# Patient Record
Sex: MALE | Race: OTHER | HISPANIC OR LATINO
[De-identification: names, ages, dates, MRNs, and addresses within clinical notes are randomized per-mention and may not be internally consistent; named-entity substitution may affect disease eponyms.]

---
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
## 2024-09-13 ENCOUNTER — NON-APPOINTMENT (OUTPATIENT)
Age: 24
End: 2024-09-13

## 2024-10-18 ENCOUNTER — APPOINTMENT (OUTPATIENT)
Dept: OTOLARYNGOLOGY | Facility: CLINIC | Age: 24
End: 2024-10-18
Payer: COMMERCIAL

## 2024-10-18 ENCOUNTER — NON-APPOINTMENT (OUTPATIENT)
Age: 24
End: 2024-10-18

## 2024-10-18 VITALS
WEIGHT: 168 LBS | DIASTOLIC BLOOD PRESSURE: 85 MMHG | BODY MASS INDEX: 23.52 KG/M2 | SYSTOLIC BLOOD PRESSURE: 127 MMHG | HEIGHT: 71 IN

## 2024-10-18 DIAGNOSIS — Z78.9 OTHER SPECIFIED HEALTH STATUS: ICD-10-CM

## 2024-10-18 DIAGNOSIS — L91.0 HYPERTROPHIC SCAR: ICD-10-CM

## 2024-10-18 DIAGNOSIS — F17.200 NICOTINE DEPENDENCE, UNSPECIFIED, UNCOMPLICATED: ICD-10-CM

## 2024-10-18 PROCEDURE — 99203 OFFICE O/P NEW LOW 30 MIN: CPT

## 2024-11-12 ENCOUNTER — APPOINTMENT (OUTPATIENT)
Dept: OTOLARYNGOLOGY | Facility: CLINIC | Age: 24
End: 2024-11-12
Payer: COMMERCIAL

## 2024-11-12 PROCEDURE — 11311 SHAVE SKIN LESION 0.6-1.0 CM: CPT

## 2024-11-20 ENCOUNTER — APPOINTMENT (OUTPATIENT)
Dept: OTOLARYNGOLOGY | Facility: CLINIC | Age: 24
End: 2024-11-20
Payer: COMMERCIAL

## 2024-11-20 VITALS — BODY MASS INDEX: 23.52 KG/M2 | HEIGHT: 71 IN | WEIGHT: 168 LBS

## 2024-11-20 DIAGNOSIS — L91.0 HYPERTROPHIC SCAR: ICD-10-CM

## 2024-11-20 PROCEDURE — 99212 OFFICE O/P EST SF 10 MIN: CPT

## 2024-12-11 ENCOUNTER — APPOINTMENT (OUTPATIENT)
Dept: OTOLARYNGOLOGY | Facility: CLINIC | Age: 24
End: 2024-12-11

## 2025-03-11 ENCOUNTER — EMERGENCY (EMERGENCY)
Facility: HOSPITAL | Age: 25
LOS: 1 days | Discharge: HOME | End: 2025-03-11
Attending: EMERGENCY MEDICINE | Admitting: EMERGENCY MEDICINE
Payer: COMMERCIAL

## 2025-03-11 VITALS
HEIGHT: 71 IN | RESPIRATION RATE: 15 BRPM | DIASTOLIC BLOOD PRESSURE: 88 MMHG | SYSTOLIC BLOOD PRESSURE: 129 MMHG | TEMPERATURE: 98 F | OXYGEN SATURATION: 98 % | HEART RATE: 79 BPM | WEIGHT: 169.98 LBS

## 2025-03-11 PROCEDURE — 65205 REMOVE FOREIGN BODY FROM EYE: CPT | Mod: RT

## 2025-03-11 PROCEDURE — 67938 REMOVE EYELID FOREIGN BODY: CPT | Mod: RT

## 2025-03-11 PROCEDURE — 99283 EMERGENCY DEPT VISIT LOW MDM: CPT | Mod: 25

## 2025-03-11 PROCEDURE — 99284 EMERGENCY DEPT VISIT MOD MDM: CPT | Mod: 25

## 2025-03-11 RX ORDER — ERYTHROMYCIN 5 MG/G
1 OINTMENT OPHTHALMIC ONCE
Refills: 0 | Status: COMPLETED | OUTPATIENT
Start: 2025-03-11 | End: 2025-03-11

## 2025-03-11 RX ORDER — ERYTHROMYCIN 5 MG/G
1 OINTMENT OPHTHALMIC
Qty: 1 | Refills: 0
Start: 2025-03-11

## 2025-03-11 RX ORDER — TETRACAINE HYDROCHLORIDE 5 MG/ML
1 SOLUTION OPHTHALMIC ONCE
Refills: 0 | Status: COMPLETED | OUTPATIENT
Start: 2025-03-11 | End: 2025-03-11

## 2025-03-11 RX ORDER — FLUORESCEIN SODIUM 0.6 MG
1 STRIP OPHTHALMIC (EYE) ONCE
Refills: 0 | Status: COMPLETED | OUTPATIENT
Start: 2025-03-11 | End: 2025-03-11

## 2025-03-11 RX ADMIN — Medication 1 APPLICATION(S): at 17:16

## 2025-03-11 RX ADMIN — ERYTHROMYCIN 1 APPLICATION(S): 5 OINTMENT OPHTHALMIC at 17:37

## 2025-03-11 RX ADMIN — TETRACAINE HYDROCHLORIDE 1 DROP(S): 5 SOLUTION OPHTHALMIC at 17:17

## 2025-03-11 NOTE — ED PROVIDER NOTE - NSFOLLOWUPINSTRUCTIONS_ED_ALL_ED_FT
Eye Foreign Body  An eye foreign body is an object that's on or in your eyeball that should not be there. It can hurt your eye. This object may be:  A speck of dirt or dust.  A hair or eyelash.  A splinter.  A tiny piece of metal.  If the object is inside your eyeball, you'll need to get help right away.    What are the causes?  You may get an object on or in your eyeball if:  Something hits your eye.  You work with certain tools that can throw tiny pieces of metal into the air.  What are the signs or symptoms?  Symptoms may depend on where the object is. It may be on:  The inside of your eyelids or on the thin, clear membrane that covers your eye. This membrane is called the conjunctiva. An object here may cause:  Pain and irritation.  The feeling that something is in your eye.  Tears to come from your eye.  Redness.  The clear covering on the front of your eye. This is called your cornea. An object here may cause:  Pain and irritation.  Small rings of rust around the object if it's metal.  The feeling that something is in your eye.  Blurry eyesight.  Tears to come from your eye.  Redness.  Inside your eyeball. An object here may cause:  A lot of pain.  Loss of eyesight or blurry eyesight.  A change in the shape of the black part of your eye.  How is this diagnosed?  An object may be found during an eye exam.  It may be easy for your health care provider to spot if it's on your eyelids, conjunctiva, or cornea.  It may be harder to find if it's inside your eyeball. The object may cause your eye to cloud over. You may need tests to find the object. These may include:  An ultrasound.  X-rays.  A CT scan.  A fluorescein stain. This can also check for damage to your eye.  How is this treated?  If the object is on your eyelids, conjunctiva, or cornea, you may need to:  Have the object taken out by your provider. Your provider may wash out your eye or use small tools to remove the object. If you have rust in your eye, your provider will also remove the rust.  Use eye drops to numb your eye. This will help with pain.  Use antibiotic drops or ointment if the object scratched your cornea.  Wear a bandage called an eye shield over your eye.  If the object is inside your eyeball, you'll need surgery right away.    You may also need to see an expert in treating eyes called an ophthalmologist.    Follow these instructions at home:  An adult with an eye shield covering one eye.  Medicines    Take over-the-counter and prescription medicines only as told by your provider. Use eye drops as told.  If you were prescribed antibiotic drops or ointments, use them as told by your provider. Do not stop using the antibiotic even if you start to feel better.  General instructions    If you have an eye shield:  Wear it as told. Take it off only as told by your provider.  Do not drive or use machinery.  If you don't have an eye shield:  Keep your eye closed as much as you can.  Do not rub your eye.  Do not wear contact lenses until your eye feels normal, or as told by your provider.  Wear dark sunglasses to protect your eyes from bright light.  If you're doing things that could hurt your eyes, wear goggles.  Contact a health care provider if:  You have more pain in your eye.  You have problems with your eye shield.  You have fluid coming from your eye.  Get help right away if:  Your ability to see gets worse.  You have more redness and swelling in or around your eye.  You lose your eyesight.  This information is not intended to replace advice given to you by your health care provider. Make sure you discuss any questions you have with your health care provider.

## 2025-03-11 NOTE — ED PROVIDER NOTE - NSFOLLOWUPCLINICS_GEN_ALL_ED_FT
[facility]  Ophthalmology  [address] [place] [address] [address]  [place] [address]  Phone: [phone]  Fax:   Follow Up Time: 1-3 Days

## 2025-03-11 NOTE — ED PROVIDER NOTE - OBJECTIVE STATEMENT
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  Patient unsure if it was dirt dust or some other material.  Patient relates he tried to wash his eye out in the eyewash station without relief.  Patient denies any other complaints

## 2025-03-11 NOTE — ED PROVIDER NOTE - DIFFERENTIAL DIAGNOSIS
Differential Diagnosis Differential including but not limited to corneal or conjunctival foreign body corneal abrasion

## 2025-03-11 NOTE — ED ADULT NURSE NOTE - CINV DISCH TEACH PARTICIP
Patient examined and evaluated.  Surgical site dressed with silver alginate and dry, sterile dressing.  Patient is to be partial weight bearing to the right  heel in surgical shoe   Surgical pathology and cultures pending at this time.  Prelim culture of proximal phalanx positive for gram positive cocci   Antibiotics as per ID recommendations.     Wound Care Instructions:  -Keep dressing clean, dry, and intact to the right foot  -Change dressing to the right foot every other day  -Apply silver alginate and cover with sterile gauze, abd pad and shira   -Patient must remain partial weightbearing to the right heel in surgical shoe   -Monitor for any signs of infection including redness, swelling in the leg above the bandage, nausea/vomiting/fever/chills/chest pain/shortness of breath, if any are present patient must report to the emergency department immediately  -Patient is to follow up with Dr. Campos/Dr. Rashid/ Dr. Irwin  within 5 days after discharge at [facility] [place]. Please call to make an appointment [phone]
podiatry eval with Dr. Irwin  [facility] wound [facility]
podiatry eval with Dr. Irwin  [facility] wound care
Patient

## 2025-03-11 NOTE — ED PROVIDER NOTE - CLINICAL SUMMARY MEDICAL DECISION MAKING FREE TEXT BOX
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  Patient unsure if it was dirt dust or some other material.  Patient relates he tried to wash his eye out in the eyewash station without relief.  Patient denies any other complaints    Plan patient reported full relief of symptoms after tetracaine drop applied fluorescein exam reveals no abrasion punctate foreign body seen under upper lid on exam removed with Q-tip will discharge with erythromycin ointment and ophthalmology follow-up    Differential including but not limited to corneal or conjunctival foreign body corneal abrasion

## 2025-03-11 NOTE — ED PROVIDER NOTE - PATIENT PORTAL LINK FT
You can access the FollowMyHealth Patient Portal offered by [facility] by registering at the following website: [email or web address]. By joining [facility]’s FollowMyHealth portal, you will also be able to view your health information using other applications (apps) compatible with our system.
General

## 2025-03-11 NOTE — ED ADULT TRIAGE NOTE - CHIEF COMPLAINT QUOTE
Something went into my right eye when I opened a window. patient stated he washed his eye but still there is something in his eye

## 2025-03-11 NOTE — ED ADULT NURSE NOTE - OBJECTIVE STATEMENT
patient A&Ox4 presents to ED states he opened a window and something flew into his right eye, causing sudden onset tears and pain. used eye wash station x 10 minutes with no relief. no pain meds taken PTA.